# Patient Record
(demographics unavailable — no encounter records)

---

## 2024-10-31 NOTE — HISTORY OF PRESENT ILLNESS
[FreeTextEntry1] : 74 year old male presents upon referral from current neurology group in [facility].   Patient was diagnosed in 2016 with PD. Initial symptoms were change in gait, shuffling. Soon after his diagnosis he began to take sinemet, unclear what dose. He started neupro 3 years ago at 4mg and increased to 8mg but it cased skin sensitivity, improved when dose reduced to 6mg. Denies falls but feels unbalanced. Using a cane. Goes to PT 3x week.   He has a HHA 35 hours a week who began working with him in 2020. Needs assistance with dressing, bathing and meal preparation. Some days he is more mobile than others.  Daughter states he has BLE tremors which occur if he is late to take his dose. each dose lasts 5 hours. getting oob Is difficult   no LID  Nonmotor On bisacodyl qod, needs it to have a BM  Wears a brief x 1 year for incontinence, reports hesitancy Denies VH Has trouble engaging in conversations, denies brain fog Sleep is effected by RBD Denies difficulty swallowing  +siallorhea Falls asleep during the daytime and has confusion    Nuepro 6mg Carbidopa/levodopa 50/200 1 TID 8-2-8 tamsulosin lisinopril rosuvastatin 5 metformin bisacodyl

## 2024-10-31 NOTE — DISCUSSION/SUMMARY
[FreeTextEntry1] : 74 year old male with PD since 2016 presents to [facility] care. He appears underdosed, reports bothersome slowness and stiffness. Needs help with ADLs. Tremors are mostly well controlled. No red flag signs to indicate an atypical. Sleep attacks and episodes of confusion may be r/t DA, will wean off. Morning akinesia  RBD  Patient was counseled on the following recommendations refer to urology melatonin 3-5 mg at bedtime for RBD sinemet 25/100 1.5 tabs  QID  sinemet 50/200 1 tab at bedtime  Reduce neupro to 4mg x 1 month then reduce to 2mg then d/c  continue PT and exercise  written instructions provided to pt and daughter  f/u 3 month

## 2024-10-31 NOTE — PHYSICAL EXAM
[General Appearance - Alert] : alert [Oriented To Time, Place, And Person] : oriented to person, place, and time [FreeTextEntry1] : +2 Facial hypomimia, reduced blink rate Vertical eye movements intact without square wave jerks +2 Hypophonic speech +2 Rigidity of neck rotation +2Rigidity of limbs present with contralateral activation  0 Resting tremor 0 Action tremor 0 Postural tremor  +2.5 L>R Bradykinesia with finger tapping, hand supination/pronation, foot tapping, foot stomping. No dysmetria with finger to nose pushes to stand Slow gait initiation, decreased stride length, reduced arm swing, no freezing of gait upon turning, requires multiple steps with turning.

## 2024-10-31 NOTE — HISTORY OF PRESENT ILLNESS
[FreeTextEntry1] : 74 year old male presents upon referral from current neurology group in [facility].   Patient was diagnosed in 2016 with PD. Initial symptoms were change in gait, shuffling. Soon after his diagnosis he began to take sinemet, unclear what dose. He started neupro 3 years ago at 4mg and increased to 8mg but it cased skin sensitivity, improved when dose reduced to 6mg. Denies falls but feels unbalanced. Using a cane. Goes to PT 3x week.   He has a HHA 35 hours a week who began working with him in 2020. Needs assistance with dressing, bathing and meal preparation. Some days he is more mobile than others.  Daughter states he has BLE tremors which occur if he is late to take his dose. each dose lasts 5 hours. getting oob Is difficult   no LID  Nonmotor On bisacodyl qod, needs it to have a BM  Wears a brief x 1 year for incontinence, reports hesitancy Denies VH Has trouble engaging in conversations, denies brain fog Sleep is effected by RBD Denies difficulty swallowing  +siallorhea Falls asleep during the daytime and has confusion    Nuepro 6mg Carbidopa/levodopa 50/200 1 TID 8-2-8 tamsulosin lisinopril rosuvastatin 5 metformin bisacodyl

## 2024-10-31 NOTE — DISCUSSION/SUMMARY
[FreeTextEntry1] : 74 year old male with PD since 2016 presents to [facility] care. He appears underdosed, reports bothersome slowness and stiffness. Needs help with ADLs. Tremors are mostly well controlled. No red flag signs to indicate an atypical. Sleep attacks and episodes of confusion may be r/t DA, will wean off. Morning akinesia  RBD  Patient was counseled on the following recommendations refer to urology melatonin 3-5 mg at bedtime for RBD sinemet 25/100 1.5 tabs  QID  sinemet 50/200 1 tab at bedtime  Reduce neupro to 4mg x 1 month then reduce to 2mg then d/c  continue PT and exercise  written instructions provided to pt and daughter  f/u 3 month

## 2025-04-02 NOTE — PHYSICAL EXAM
[FreeTextEntry1] : +2 Facial hypomimia, reduced blink rate Vertical eye movements intact without square wave jerks +2 Hypophonic speech +2 Rigidity of neck rotation +2 Rigidity of limbs present with contralateral activation  +1 Left side Resting tremor 0 Action tremor 0 Postural tremor  +2.5 L>R Bradykinesia with finger tapping, hand supination/pronation, foot tapping, foot stomping. No dysmetria with finger to nose pushes to stand Slow gait initiation, decreased stride length, reduced L>R arm swing, no freezing of gait upon turning, requires multiple steps with turning.

## 2025-04-02 NOTE — HISTORY OF PRESENT ILLNESS
[FreeTextEntry1] : Interim Hx  Successfully weaned off neupro, no longer feels daytime sleepiness or confusion episodes.  He is taking Sinemet as instructed. Overall doing better. Unsure of L-dopa kinetics. Walks a little. Has not started PT. Denies falls. Needs help with dressing and bathing.  Use a cane to walk in and out of his house.  Seeing urology, had uro lift 3 weeks ago  Feels cold at times, unclear if coincides with wearing offs. Happens more at night  No morning akinesia   Nonmotor On bisacodyl PO qod, needs it to have a BM, takes it 3 times a week  Wears a brief x 1 year for incontinence, reports hesitancy Denies VH Sleep is effected by RBD-talks in his sleep Denies difficulty swallowing   Medications  C/L 25/100 1.5 tabs QID 8-12-4-8 Sinemet ER 50/200 1 tab qhs tamsulosin lisinopril rosuvastatin 5 metformin bisacodyl

## 2025-04-02 NOTE — DISCUSSION/SUMMARY
[FreeTextEntry1] : 74 year old male with PD since 2016 presents to for follow up. No awareness of L-dopa kinetics. motorically stable at this time, does not wish to increase medicaitons at this time. Sedentary.    Patient was counseled on the following recommendations Continue sinemet 25/100 1.5 tabs  QID  Continue sinemet 50/200 1 tab at bedtime  Refer to PT and exercise encouraged Start miralax qd instead of bisacodyl    f/u 3-4 months